# Patient Record
Sex: MALE | Race: WHITE | Employment: OTHER | URBAN - METROPOLITAN AREA
[De-identification: names, ages, dates, MRNs, and addresses within clinical notes are randomized per-mention and may not be internally consistent; named-entity substitution may affect disease eponyms.]

---

## 2020-09-30 RX ORDER — ROSUVASTATIN CALCIUM 20 MG/1
TABLET, COATED ORAL
Qty: 90 TAB | Refills: 1 | Status: SHIPPED | OUTPATIENT
Start: 2020-09-30 | End: 2020-10-06 | Stop reason: SDUPTHER

## 2020-10-06 ENCOUNTER — TELEPHONE (OUTPATIENT)
Dept: INTERNAL MEDICINE CLINIC | Age: 60
End: 2020-10-06

## 2020-10-06 DIAGNOSIS — E03.9 ACQUIRED HYPOTHYROIDISM: Primary | ICD-10-CM

## 2020-10-06 DIAGNOSIS — E78.2 MIXED HYPERLIPIDEMIA: ICD-10-CM

## 2020-10-06 RX ORDER — LEVOTHYROXINE SODIUM 88 UG/1
88 TABLET ORAL
Qty: 90 TAB | Refills: 1 | Status: SHIPPED | OUTPATIENT
Start: 2020-10-06 | End: 2021-06-15

## 2020-10-06 RX ORDER — ROSUVASTATIN CALCIUM 20 MG/1
20 TABLET, COATED ORAL
Qty: 90 TAB | Refills: 1 | Status: SHIPPED | OUTPATIENT
Start: 2020-10-06 | End: 2021-02-05 | Stop reason: SDUPTHER

## 2020-10-06 RX ORDER — ATORVASTATIN CALCIUM 40 MG/1
40 TABLET, FILM COATED ORAL
COMMUNITY
End: 2020-10-06 | Stop reason: SDUPTHER

## 2020-10-06 NOTE — TELEPHONE ENCOUNTER
Patient called and needs his levothyroxine 88 mcg tab and his rosuvastatin 20 mg tab sent in to [facility] in [place]

## 2020-11-29 ENCOUNTER — TELEPHONE (OUTPATIENT)
Dept: INTERNAL MEDICINE CLINIC | Age: 60
End: 2020-11-29

## 2020-11-29 DIAGNOSIS — K21.9 GASTROESOPHAGEAL REFLUX DISEASE, UNSPECIFIED WHETHER ESOPHAGITIS PRESENT: Primary | ICD-10-CM

## 2020-11-30 RX ORDER — OMEPRAZOLE 40 MG/1
40 CAPSULE, DELAYED RELEASE ORAL DAILY
Qty: 90 CAP | Refills: 2 | Status: SHIPPED | OUTPATIENT
Start: 2020-11-30 | End: 2021-08-23

## 2020-11-30 RX ORDER — METFORMIN HYDROCHLORIDE 500 MG/1
TABLET ORAL
Qty: 180 TAB | Refills: 2 | Status: SHIPPED | OUTPATIENT
Start: 2020-11-30 | End: 2021-08-28

## 2020-11-30 NOTE — TELEPHONE ENCOUNTER
Patient called and needs a refill on his omeprazole 40 mg cap delayed release sent in to [facility] pharmacy

## 2020-12-02 ENCOUNTER — TELEPHONE (OUTPATIENT)
Dept: INTERNAL MEDICINE CLINIC | Age: 60
End: 2020-12-02

## 2020-12-02 DIAGNOSIS — E11.9 CONTROLLED TYPE 2 DIABETES MELLITUS WITHOUT COMPLICATION, WITHOUT LONG-TERM CURRENT USE OF INSULIN (HCC): ICD-10-CM

## 2020-12-02 DIAGNOSIS — E78.2 MIXED HYPERLIPIDEMIA: ICD-10-CM

## 2020-12-02 DIAGNOSIS — E03.9 ACQUIRED HYPOTHYROIDISM: ICD-10-CM

## 2020-12-02 DIAGNOSIS — I10 ESSENTIAL HYPERTENSION: Primary | ICD-10-CM

## 2020-12-02 NOTE — TELEPHONE ENCOUNTER
Patient called and would like for you to put in the orders for his yearly blood work so he can get it done before his appt

## 2020-12-10 ENCOUNTER — TELEPHONE (OUTPATIENT)
Dept: INTERNAL MEDICINE CLINIC | Age: 60
End: 2020-12-10

## 2020-12-10 NOTE — TELEPHONE ENCOUNTER
Patient called and needs a refill on his metFORMIN but I see where it was sent on 11/30 for 90 days with 2 refills can you look into why they say they dont have his refill

## 2020-12-14 NOTE — TELEPHONE ENCOUNTER
Tried to call patient again and yes this was refilled with refills.  Patient has med at pharmacy, he needs to call the pharmacy

## 2020-12-23 RX ORDER — MECLIZINE HYDROCHLORIDE 25 MG/1
TABLET ORAL
Qty: 90 TAB | Refills: 1 | Status: SHIPPED | OUTPATIENT
Start: 2020-12-23 | End: 2021-11-09

## 2021-01-27 DIAGNOSIS — E03.9 ACQUIRED HYPOTHYROIDISM: ICD-10-CM

## 2021-01-27 DIAGNOSIS — E78.2 MIXED HYPERLIPIDEMIA: ICD-10-CM

## 2021-01-27 DIAGNOSIS — I10 ESSENTIAL HYPERTENSION: ICD-10-CM

## 2021-01-27 DIAGNOSIS — E11.9 CONTROLLED TYPE 2 DIABETES MELLITUS WITHOUT COMPLICATION, WITHOUT LONG-TERM CURRENT USE OF INSULIN (HCC): ICD-10-CM

## 2021-02-05 ENCOUNTER — OFFICE VISIT (OUTPATIENT)
Dept: INTERNAL MEDICINE CLINIC | Age: 61
End: 2021-02-05
Payer: COMMERCIAL

## 2021-02-05 VITALS
SYSTOLIC BLOOD PRESSURE: 142 MMHG | HEART RATE: 84 BPM | WEIGHT: 235 LBS | BODY MASS INDEX: 36.88 KG/M2 | HEIGHT: 67 IN | TEMPERATURE: 97.7 F | RESPIRATION RATE: 20 BRPM | DIASTOLIC BLOOD PRESSURE: 88 MMHG | OXYGEN SATURATION: 94 %

## 2021-02-05 DIAGNOSIS — E11.9 CONTROLLED TYPE 2 DIABETES MELLITUS WITHOUT COMPLICATION, WITHOUT LONG-TERM CURRENT USE OF INSULIN (HCC): Primary | ICD-10-CM

## 2021-02-05 DIAGNOSIS — E03.9 ACQUIRED HYPOTHYROIDISM: ICD-10-CM

## 2021-02-05 DIAGNOSIS — Z00.00 ANNUAL PHYSICAL EXAM: ICD-10-CM

## 2021-02-05 DIAGNOSIS — I10 ESSENTIAL HYPERTENSION: ICD-10-CM

## 2021-02-05 DIAGNOSIS — E66.01 MORBID OBESITY DUE TO EXCESS CALORIES (HCC): ICD-10-CM

## 2021-02-05 DIAGNOSIS — E78.2 MIXED HYPERLIPIDEMIA: ICD-10-CM

## 2021-02-05 PROBLEM — K42.9 UMBILICAL HERNIA WITHOUT OBSTRUCTION AND WITHOUT GANGRENE: Status: ACTIVE | Noted: 2021-02-05

## 2021-02-05 PROBLEM — K21.00 GASTROESOPHAGEAL REFLUX DISEASE WITH ESOPHAGITIS: Status: ACTIVE | Noted: 2021-02-05

## 2021-02-05 PROBLEM — N43.3 HYDROCELE IN ADULT: Status: ACTIVE | Noted: 2021-02-05

## 2021-02-05 PROCEDURE — 99396 PREV VISIT EST AGE 40-64: CPT | Performed by: INTERNAL MEDICINE

## 2021-02-05 PROCEDURE — 99214 OFFICE O/P EST MOD 30 MIN: CPT | Performed by: INTERNAL MEDICINE

## 2021-02-05 RX ORDER — ROSUVASTATIN CALCIUM 20 MG/1
20 TABLET, COATED ORAL
Qty: 90 TAB | Refills: 1 | Status: SHIPPED | OUTPATIENT
Start: 2021-02-05 | End: 2021-11-09

## 2021-02-05 RX ORDER — LISINOPRIL 5 MG/1
5 TABLET ORAL DAILY
Qty: 90 TAB | Refills: 1 | Status: SHIPPED | OUTPATIENT
Start: 2021-02-05 | End: 2021-07-17

## 2021-02-05 NOTE — PROGRESS NOTES
1. Mixed hyperlipidemia  ldl WAS LESS THAN 70. Good control  - rosuvastatin (CRESTOR) 20 mg tablet; Take 1 Tab by mouth nightly. Dispense: 90 Tab; Refill: 1    2. Controlled type 2 diabetes mellitus without complication, without long-term current use of insulin (HCC)  The patient's hemoglobin A1c was 6.4. I am very happy with this. He is due for urine microalbumin. Will refer to podiatry for diabetic foot exam.  He just had his eye exam last month which was normal.  I am also referring him to diabetic education  - REFERRAL TO PODIATRY  - MICROALBUMIN, UR, RAND W/ MICROALB/CREAT RATIO  - REFERRAL TO DIABETIC EDUCATION    3. Morbid obesity due to excess calories [facility])  This is a new problem especially given the amount of weight gain he has had since I last saw him. It appears he is gained at least 25 pounds. I am referring him to diabetic teaching but I have also given him resources including information to look up on YouTube regarding the keto diet. I have set the goal of 20 pounds of weight loss over the next 3 months    4. Essential hypertension  This is a new problem. We will start Zestril 5 mg daily which will serve to help his blood pressure and protect his kidneys from the effects of diabetes  - lisinopriL (PRINIVIL, ZESTRIL) 5 mg tablet; Take 1 Tab by mouth daily. Dispense: 90 Tab; Refill: 1    5. Acquired hypothyroidism  This is a chronic problem. Continue Synthroid but check a TSH  - TSH 3RD GENERATION    6. Annual physical exam  Good urine microalbumin and a PSA  - MICROALBUMIN, UR, RAND W/ MICROALB/CREAT RATIO  - PSA SCREENING (SCREENING)      Chief Complaint   Patient presents with    Physical    f/u htn, dm, obesity    HPI   This is a very pleasant 50-year-old gentleman with history of diabetes and dyslipidemia who presents today for his annual physical exam.  He reports he has been doing well although admittedly has gained 20 to 30 pounds.   He blames much of this on his sedentary lifestyle and the fact that he likes food too much. He is also been complaining of increasing aches and pains including hip pain and knee pain. He reports some mild paresthesias in his feet as well and as it turns out he has not seen a podiatrist for his diabetic foot exam.  He denies any headache or vision changes he has no chest pain palpitations or shortness of breath. He has been checking his blood sugars and they are always in the very low 100s. He has been tolerating his statin with out any new muscle pains. He overall reports he feels really good and is working really hard. He actually notes he works all the time. Patient Active Problem List   Diagnosis Code    Hyperlipidemia E78.5    Gastroesophageal reflux disease with esophagitis K21.00    Hydrocele in adult Q49.7    Umbilical hernia without obstruction and without gangrene K42.9        Current Outpatient Medications on File Prior to Visit   Medication Sig Dispense Refill    meclizine (ANTIVERT) 25 mg tablet take 1 tablet by mouth three times a day 90 Tab 1    metFORMIN (GLUCOPHAGE) 500 mg tablet take 1 tablet by mouth twice a day for 90 DAYS 180 Tab 2    omeprazole (PRILOSEC) 40 mg capsule Take 1 Cap by mouth daily. 90 Cap 2    levothyroxine (SYNTHROID) 88 mcg tablet Take 1 Tab by mouth Daily (before breakfast). 90 Tab 1     No current facility-administered medications on file prior to visit. ROS  - GEN: no weight gain/loss, no fevers or chills  - HEENT: no vision changes, no tinnitus, no sore throat  - CV: no cp, palpitations or edema  - RESP: no sob, cough  - ABD: no n/v/d, no blood in stool  - : no dysuria or changes in freq.   - SKIN: no rashes, ulcers  - Neuro: no resting tremors, parasthesia in extremities, no headaches  - MS: No weakness in extremities, no gait abnormalities  - Psych: negative for depression or anxiety      Visit Vitals  BP (!) 142/88   Pulse 84   Temp 97.7 °F (36.5 °C)   Resp 20   Ht 5' 7\" (1.702 m)   Wt 235 lb (106.6 kg)   SpO2 94%   BMI 36.81 kg/m²           Physical Exam  Constitutional:       Appearance: Normal appearance. obese. NAD and pleasant  HENT:      Head: Normocephalic. Nose: Nose normal.      Mouth/Throat:      Mouth: Mucous membranes are moist. Throat not inflammed  Eyes:      Extraocular Movements: Extraocular movements intact. Conjunctiva/sclera: Conjunctivae normal. Sclera anicteric     Pupils: Pupils are equal, round, and reactive to light. Cardiovascular:      Rate and Rhythm: Normal rate and regular rhythm. Pulses: Normal pulses. Pulmonary:      Effort: No respiratory distress. Breath sounds: CTAB and No stridor. No rhonchi. Abdominal:      General: There is no distension. NT, ND  Neurological:      Mental Status: patient is alert and oriented times 3.  No resting tremor, normal gait     Cranial Nerves: cranial nerves grossly intact  Muskuloskeletal     Full ROM in extremities     Normal gait  Skin     Dry without lesions on examined areas, warm to the touch       Deferred  Psychiatry     Calm, normal affect, interacting normally

## 2021-02-05 NOTE — PROGRESS NOTES
Georgie Billy presents today for   Chief Complaint   Patient presents with    Physical       Is someone accompanying this pt? no  Is the patient using any DME equipment during OV? no    Depression Screening:  3 most recent PHQ Screens 2/5/2021   Little interest or pleasure in doing things Not at all   Feeling down, depressed, irritable, or hopeless Not at all   Total Score PHQ 2 0       Learning Assessment:  Learning Assessment 2/5/2021   PRIMARY LEARNER Patient   HIGHEST LEVEL OF EDUCATION - PRIMARY LEARNER  GRADUATED HIGH SCHOOL OR GED   BARRIERS PRIMARY LEARNER NONE   PRIMARY LANGUAGE ENGLISH   LEARNER PREFERENCE PRIMARY DEMONSTRATION   ANSWERED BY patient   RELATIONSHIP SELF       Fall Risk  No flowsheet data found. ADL  No flowsheet data found. Health Maintenance reviewed and discussed and ordered per Provider. Health Maintenance Due   Topic Date Due    Hepatitis C Screening  1960    Foot Exam Q1  02/12/1969    MICROALBUMIN Q1  02/12/1969    Eye Exam Retinal or Dilated  02/12/1969    COVID-19 Vaccine (1 of 2) 02/12/1975    DTaP/Tdap/Td series (1 - Tdap) 02/12/1980    Shingrix Vaccine Age 50> (1 of 2) 02/12/2009    Colorectal Cancer Screening Combo  02/12/2009    Flu Vaccine (1) 09/01/2020   . Coordination of Care:  1. Have you been to the ER, urgent care clinic since your last visit? Hospitalized since your last visit? no    2. Have you seen or consulted any other health care providers outside of the [address] [place] [address] since your last visit? Include any pap smears or colon screening.  no

## 2021-03-30 LAB — HBA1C MFR BLD HPLC: 5.5 %

## 2021-06-15 ENCOUNTER — CLINICAL SUPPORT (OUTPATIENT)
Dept: INTERNAL MEDICINE CLINIC | Age: 61
End: 2021-06-15
Payer: COMMERCIAL

## 2021-06-15 DIAGNOSIS — E03.9 ACQUIRED HYPOTHYROIDISM: Primary | ICD-10-CM

## 2021-06-15 PROCEDURE — 36415 COLL VENOUS BLD VENIPUNCTURE: CPT | Performed by: INTERNAL MEDICINE

## 2021-06-15 NOTE — PROGRESS NOTES
Verbal order from provider Dr. Kathryn Quinonez to draw labs. Labs were drawn and sent to [facility] by Shashank Cruz LPN  Draw site Right [facility]. Patient tolerated draw with no distress.

## 2021-06-16 LAB
PSA SERPL-MCNC: 0.8 NG/ML (ref 0–4)
SPECIMEN STATUS REPORT, ROLRST: NORMAL
TSH SERPL DL<=0.005 MIU/L-ACNC: 4.57 UIU/ML (ref 0.45–4.5)

## 2021-06-22 ENCOUNTER — OFFICE VISIT (OUTPATIENT)
Dept: INTERNAL MEDICINE CLINIC | Age: 61
End: 2021-06-22
Payer: COMMERCIAL

## 2021-06-22 VITALS
DIASTOLIC BLOOD PRESSURE: 85 MMHG | SYSTOLIC BLOOD PRESSURE: 128 MMHG | OXYGEN SATURATION: 96 % | HEART RATE: 82 BPM | WEIGHT: 237 LBS | BODY MASS INDEX: 37.2 KG/M2 | HEIGHT: 67 IN | TEMPERATURE: 96.5 F | RESPIRATION RATE: 18 BRPM
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
## 2021-06-22 DIAGNOSIS — I10 ESSENTIAL HYPERTENSION: ICD-10-CM

## 2021-06-22 DIAGNOSIS — E03.9 ACQUIRED HYPOTHYROIDISM: ICD-10-CM

## 2021-06-22 DIAGNOSIS — R73.03 PREDIABETES: ICD-10-CM

## 2021-06-22 DIAGNOSIS — R25.2 SPASM: ICD-10-CM

## 2021-06-22 DIAGNOSIS — E78.2 MODERATE MIXED HYPERLIPIDEMIA NOT REQUIRING STATIN THERAPY: Primary | ICD-10-CM

## 2021-06-22 PROCEDURE — 99214 OFFICE O/P EST MOD 30 MIN: CPT | Performed by: INTERNAL MEDICINE

## 2021-06-22 RX ORDER — HYDROXYZINE PAMOATE 25 MG/1
25 CAPSULE ORAL
Qty: 30 CAPSULE | Refills: 0 | Status: SHIPPED | OUTPATIENT
Start: 2021-06-22 | End: 2021-07-06

## 2021-06-22 RX ORDER — LANOLIN ALCOHOL/MO/W.PET/CERES
400 CREAM (GRAM) TOPICAL DAILY
Qty: 60 TABLET | Refills: 0 | Status: SHIPPED | OUTPATIENT
Start: 2021-06-22

## 2021-06-22 NOTE — PROGRESS NOTES
1. Moderate mixed hyperlipidemia not requiring statin therapy  Continuing to hold his statin. I think this is appropriate until we get his labs. I strongly suspect his muscle spasms are due to low magnesium from his keto diet. We will check a lipid profile  - LIPID PANEL    2. Prediabetes  We will repeat a hemoglobin A1c. Last read was 5.5. We may need to go down on his Metformin given his weight loss      3. Essential hypertension  Blood pressure is well controlled. Check a CMP  - METABOLIC PANEL, COMPREHENSIVE    4. Acquired hypothyroidism  Check a TSH continue Synthroid  - TSH 3RD GENERATION    5. Spasm  This is an acute problem which is commonly seen in the ketogenic diet. The muscle spasms are far more pronounced in his right thigh. We will check a magnesium level and start magnesium oxide  - MAGNESIUM  - magnesium oxide (MAG-OX) 400 mg tablet; Take 1 Tablet by mouth daily. Dispense: 60 Tablet; Refill: 0    6. Bug bite, initial encounter  He has several bug bites on his legs. We will start Vistaril for the itching. I have also recommended a Benadryl stick  - hydrOXYzine pamoate (VISTARIL) 25 mg capsule; Take 1 Capsule by mouth three (3) times daily as needed for Itching for up to 14 days. Dispense: 30 Capsule; Refill: 0       Chief Complaint   Patient presents with    Follow-up    Claudication     leg cramps        HPI   This is a delightful 27-year-old gentleman with a history of prediabetes dyslipidemia and hypothyroidism. Over the past 6 months he has lost at least 30 pounds. He has been on the keto diet and has been restricting his carbs to 30 g/day. He started suffering cramps though several weeks ago. They are primarily in his right thigh and they can go all the way down to his calf. He has not been taking a supplement but there is a magnesium powder that he has taken a couple of times which he reports improved his symptoms.   He has no headache or vision changes he has no chest pain palpitation shortness of breath or cough and otherwise he reports has been doing great. He did have several bug bites which are causing him to itch quite a bit. He is asking for some help in alleviating this      Chief Complaint   Patient presents with    Follow-up    Claudication     leg cramps        HPI   Patient Active Problem List   Diagnosis Code    Hyperlipidemia E78.5    Gastroesophageal reflux disease with esophagitis K21.00    Hydrocele in adult J98.0    Umbilical hernia without obstruction and without gangrene K42.9        Current Outpatient Medications on File Prior to Visit   Medication Sig Dispense Refill    levothyroxine (SYNTHROID) 88 mcg tablet take 1 tablet by mouth once daily BEFORE BREAKFAST 90 Tablet 0    rosuvastatin (CRESTOR) 20 mg tablet Take 1 Tab by mouth nightly. 90 Tab 1    lisinopriL (PRINIVIL, ZESTRIL) 5 mg tablet Take 1 Tab by mouth daily. 90 Tab 1    meclizine (ANTIVERT) 25 mg tablet take 1 tablet by mouth three times a day 90 Tab 1    metFORMIN (GLUCOPHAGE) 500 mg tablet take 1 tablet by mouth twice a day for 90 DAYS 180 Tab 2    omeprazole (PRILOSEC) 40 mg capsule Take 1 Cap by mouth daily. 90 Cap 2     No current facility-administered medications on file prior to visit. ROS  - GEN: + weight loss, no fevers or chills  - HEENT: no vision changes, no tinnitus, no sore throat  - CV: no cp, palpitations or edema  - RESP: no sob, cough  - ABD: no n/v/d, no blood in stool  - : no dysuria or changes in freq. - SKIN:+ rashes,- ulcers  - Neuro: no resting tremors, parasthesia in extremities, no headaches  - MS: No weakness in extremities, no gait abnormalities + muslce spasms  - Psych: negative for depression or anxiety      Visit Vitals  /85   Pulse 82   Temp (!) 96.5 °F (35.8 °C)   Resp 18   Ht 5' 7\" (1.702 m)   Wt 237 lb (107.5 kg)   SpO2 96%   BMI 37.12 kg/m²           Physical Exam  Constitutional:       Appearance: Normal appearance. overweight.  NAD and pleasant  HENT:      Head: Normocephalic. Nose: Nose normal.      Mouth/Throat:     Eyes:      Extraocular Movements: Extraocular movements intact. Conjunctiva/sclera: Conjunctivae normal. Sclera anicteric     Pupils: Pupils are equal, round, and reactive to light. Cardiovascular:      Rate and Rhythm: Normal rate and regular rhythm. Pulses: Normal pulses. Pulmonary:      Effort: No respiratory distress. Breath sounds: CTAB and No stridor. No rhonchi. Abdominal:      General: There is no distension. NT, ND  Neurological:      Mental Status: patient is alert and oriented times 3. No resting tremor, normal gait     Cranial Nerves: cranial nerves grossly intact  Muskuloskeletal     Full ROM in extremities     Normal gait    Right hamstring is very tight  Skin     Multiple bug bites on shins.  No warmth associated with the lesions and no streaking       Deferred  Psychiatry     Calm, normal affect, interacting normally

## 2021-06-22 NOTE — PROGRESS NOTES
Follow up, cholesterol med was stopped,however,leg cramps still remain. Has been drinking \"calm\"      Sonal Patterson presents today for   Chief Complaint   Patient presents with    Follow-up    Claudication     leg cramps       Is someone accompanying this pt? no  Is the patient using any DME equipment during OV? no    Depression Screening:  3 most recent PHQ Screens 6/22/2021   Little interest or pleasure in doing things Not at all   Feeling down, depressed, irritable, or hopeless Not at all   Total Score PHQ 2 0       Learning Assessment:  Learning Assessment 2/5/2021   PRIMARY LEARNER Patient   HIGHEST LEVEL OF EDUCATION - PRIMARY LEARNER  GRADUATED HIGH SCHOOL OR GED   BARRIERS PRIMARY LEARNER NONE   PRIMARY LANGUAGE ENGLISH   LEARNER PREFERENCE PRIMARY DEMONSTRATION   ANSWERED BY patient   RELATIONSHIP SELF       Fall Risk  No flowsheet data found. ADL  No flowsheet data found. Health Maintenance reviewed and discussed and ordered per Provider. Health Maintenance Due   Topic Date Due    Hepatitis C Screening  Never done    Pneumococcal 0-64 years (1 of 2 - PPSV23) Never done    DTaP/Tdap/Td series (1 - Tdap) 02/12/1980    Shingrix Vaccine Age 50> (1 of 2) Never done    Colorectal Cancer Screening Combo  Never done    COVID-19 Vaccine (2 - Moderna 2-dose series) 02/11/2021   . Coordination of Care:  1. Have you been to the ER, urgent care clinic since your last visit? Hospitalized since your last visit? no    2. Have you seen or consulted any other health care providers outside of the [address] [place] [address] since your last visit? Include any pap smears or colon screening.  no

## 2021-06-24 ENCOUNTER — HOSPITAL ENCOUNTER (OUTPATIENT)
Dept: LAB | Age: 61
Discharge: HOME | End: 2021-06-24

## 2021-06-25 LAB
ALBUMIN SERPL-MCNC: 4.6 G/DL (ref 3.8–4.8)
ALBUMIN/GLOB SERPL: 2.9 {RATIO} (ref 1.2–2.2)
ALP SERPL-CCNC: 82 IU/L (ref 48–121)
ALT SERPL-CCNC: 15 IU/L (ref 0–44)
AST SERPL-CCNC: 18 IU/L (ref 0–40)
BILIRUB SERPL-MCNC: 0.5 MG/DL (ref 0–1.2)
BUN SERPL-MCNC: 22 MG/DL (ref 8–27)
BUN/CREAT SERPL: 21 (ref 10–24)
CALCIUM SERPL-MCNC: 9.6 MG/DL (ref 8.6–10.2)
CHLORIDE SERPL-SCNC: 107 MMOL/L (ref 96–106)
CHOLEST SERPL-MCNC: 215 MG/DL (ref 100–199)
CO2 SERPL-SCNC: 22 MMOL/L (ref 20–29)
CREAT SERPL-MCNC: 1.06 MG/DL (ref 0.76–1.27)
EST. AVERAGE GLUCOSE BLD GHB EST-MCNC: 123 MG/DL
GLOBULIN SER CALC-MCNC: 1.6 G/DL (ref 1.5–4.5)
GLUCOSE SERPL-MCNC: 98 MG/DL (ref 65–99)
HBA1C MFR BLD: 5.9 % (ref 4.8–5.6)
HDLC SERPL-MCNC: 47 MG/DL
LDLC SERPL CALC-MCNC: 141 MG/DL (ref 0–99)
MAGNESIUM SERPL-MCNC: 2.1 MG/DL (ref 1.6–2.3)
POTASSIUM SERPL-SCNC: 5 MMOL/L (ref 3.5–5.2)
PROT SERPL-MCNC: 6.2 G/DL (ref 6–8.5)
SODIUM SERPL-SCNC: 141 MMOL/L (ref 134–144)
TRIGL SERPL-MCNC: 149 MG/DL (ref 0–149)
TSH SERPL DL<=0.005 MIU/L-ACNC: 5.58 UIU/ML (ref 0.45–4.5)
VLDLC SERPL CALC-MCNC: 27 MG/DL (ref 5–40)

## 2021-07-01 ENCOUNTER — TELEPHONE (OUTPATIENT)
Dept: INTERNAL MEDICINE CLINIC | Age: 61
End: 2021-07-01

## 2021-07-01 LAB — HBA1C MFR BLD HPLC: 5.6 %

## 2021-07-17 DIAGNOSIS — I10 ESSENTIAL HYPERTENSION: ICD-10-CM

## 2021-07-17 RX ORDER — LISINOPRIL 5 MG/1
TABLET ORAL
Qty: 90 TABLET | Refills: 1 | Status: SHIPPED | OUTPATIENT
Start: 2021-07-17 | End: 2021-09-29 | Stop reason: SDUPTHER

## 2021-07-19 ENCOUNTER — TELEPHONE (OUTPATIENT)
Dept: INTERNAL MEDICINE CLINIC | Age: 61
End: 2021-07-19

## 2021-07-19 DIAGNOSIS — E78.2 MIXED HYPERLIPIDEMIA: ICD-10-CM

## 2021-07-19 NOTE — TELEPHONE ENCOUNTER
Patient stated he is moving in the next month and would like to keep Dr. Riri Garces as his Doctor, but is requesting a 90 day supply to [facility] in [place] for his scripts because his insurance is also changing at the end of the month RX Metformin, Levothyroxine, Crestor patient stated the RX Crestor 20 mg, made patiens legs cramp he has not been taking it but his blood work showed chol elevated so he suggested a lower dose of the Crestor.    [phone]

## 2021-08-23 DIAGNOSIS — K21.9 GASTROESOPHAGEAL REFLUX DISEASE, UNSPECIFIED WHETHER ESOPHAGITIS PRESENT: ICD-10-CM

## 2021-08-23 RX ORDER — OMEPRAZOLE 40 MG/1
CAPSULE, DELAYED RELEASE ORAL
Qty: 90 CAPSULE | Refills: 2 | Status: SHIPPED | OUTPATIENT
Start: 2021-08-23 | End: 2021-11-09

## 2021-08-28 RX ORDER — METFORMIN HYDROCHLORIDE 500 MG/1
TABLET ORAL
Qty: 180 TABLET | Refills: 2 | Status: SHIPPED | OUTPATIENT
Start: 2021-08-28 | End: 2021-11-09 | Stop reason: SDUPTHER

## 2021-09-14 DIAGNOSIS — E03.9 ACQUIRED HYPOTHYROIDISM: ICD-10-CM

## 2021-09-14 DIAGNOSIS — I10 ESSENTIAL HYPERTENSION: ICD-10-CM

## 2021-09-14 RX ORDER — LEVOTHYROXINE SODIUM 88 UG/1
TABLET ORAL
Qty: 90 TABLET | Refills: 1 | Status: SHIPPED | OUTPATIENT
Start: 2021-09-14 | End: 2021-09-29 | Stop reason: SDUPTHER

## 2021-09-28 NOTE — TELEPHONE ENCOUNTER
Patient has moved to [facility], now uses [facility] in [facility]. Patient is travailing to [place] and requesting refill on Lisinopril and Levothyroxine.

## 2021-09-29 RX ORDER — LISINOPRIL 5 MG/1
5 TABLET ORAL DAILY
Qty: 90 TABLET | Refills: 1 | Status: SHIPPED | OUTPATIENT
Start: 2021-09-29 | End: 2021-11-09 | Stop reason: SDUPTHER

## 2021-09-29 RX ORDER — LEVOTHYROXINE SODIUM 88 UG/1
88 TABLET ORAL
Qty: 90 TABLET | Refills: 1 | Status: SHIPPED | OUTPATIENT
Start: 2021-09-29 | End: 2021-11-09 | Stop reason: SDUPTHER

## 2021-11-09 ENCOUNTER — HOSPITAL ENCOUNTER (OUTPATIENT)
Dept: LAB | Age: 61
Discharge: HOME | End: 2021-11-09
Payer: COMMERCIAL

## 2021-11-09 ENCOUNTER — OFFICE VISIT (OUTPATIENT)
Dept: INTERNAL MEDICINE CLINIC | Age: 61
End: 2021-11-09
Payer: COMMERCIAL

## 2021-11-09 VITALS
HEIGHT: 67 IN | DIASTOLIC BLOOD PRESSURE: 83 MMHG | SYSTOLIC BLOOD PRESSURE: 126 MMHG | RESPIRATION RATE: 20 BRPM | OXYGEN SATURATION: 92 % | WEIGHT: 199.4 LBS | TEMPERATURE: 97.2 F | BODY MASS INDEX: 31.3 KG/M2 | HEART RATE: 97 BPM

## 2021-11-09 DIAGNOSIS — I10 ESSENTIAL HYPERTENSION: ICD-10-CM

## 2021-11-09 DIAGNOSIS — R73.03 PREDIABETES: ICD-10-CM

## 2021-11-09 DIAGNOSIS — E78.2 MIXED HYPERLIPIDEMIA: Primary | ICD-10-CM

## 2021-11-09 DIAGNOSIS — E03.9 ACQUIRED HYPOTHYROIDISM: ICD-10-CM

## 2021-11-09 DIAGNOSIS — G57.02 PIRIFORMIS SYNDROME OF LEFT SIDE: ICD-10-CM

## 2021-11-09 DIAGNOSIS — Z23 ENCOUNTER FOR IMMUNIZATION: ICD-10-CM

## 2021-11-09 DIAGNOSIS — Z12.11 SCREEN FOR COLON CANCER: ICD-10-CM

## 2021-11-09 LAB
CHOLEST SERPL-MCNC: 192 MG/DL
HDLC SERPL-MCNC: 53 MG/DL (ref 40–60)
HDLC SERPL: 3.6 {RATIO} (ref 0–5)
LDLC SERPL CALC-MCNC: 110 MG/DL (ref 0–100)
LIPID PROFILE,FLP: ABNORMAL
TRIGL SERPL-MCNC: 145 MG/DL (ref ?–150)
VLDLC SERPL CALC-MCNC: 29 MG/DL

## 2021-11-09 PROCEDURE — 99214 OFFICE O/P EST MOD 30 MIN: CPT | Performed by: INTERNAL MEDICINE

## 2021-11-09 PROCEDURE — 80061 LIPID PANEL: CPT

## 2021-11-09 PROCEDURE — 90471 IMMUNIZATION ADMIN: CPT | Performed by: INTERNAL MEDICINE

## 2021-11-09 PROCEDURE — 90686 IIV4 VACC NO PRSV 0.5 ML IM: CPT | Performed by: INTERNAL MEDICINE

## 2021-11-09 PROCEDURE — 36415 COLL VENOUS BLD VENIPUNCTURE: CPT

## 2021-11-09 RX ORDER — LEVOTHYROXINE SODIUM 88 UG/1
88 TABLET ORAL
Qty: 90 TABLET | Refills: 1 | Status: SHIPPED | OUTPATIENT
Start: 2021-11-09

## 2021-11-09 RX ORDER — LISINOPRIL 5 MG/1
5 TABLET ORAL DAILY
Qty: 90 TABLET | Refills: 1 | Status: SHIPPED | OUTPATIENT
Start: 2021-11-09

## 2021-11-09 RX ORDER — METFORMIN HYDROCHLORIDE 500 MG/1
500 TABLET ORAL 2 TIMES DAILY
Qty: 180 TABLET | Refills: 2 | Status: SHIPPED | OUTPATIENT
Start: 2021-11-09

## 2021-11-09 RX ORDER — GLUCOSAM/CHONDRO/HERB 149/HYAL 750-100 MG
1 TABLET ORAL DAILY
COMMUNITY

## 2021-11-09 NOTE — PROGRESS NOTES
Reina Betancourt is a 57 y.o. male who presents for routine immunizations. He denies any symptoms , reactions or allergies that would exclude them from being immunized today. Risks and adverse reactions were discussed and the VIS was given to them. All questions were addressed. He was observed for 15 min post injection. There were no reactions observed.     Nhi Slaughter LPN

## 2021-11-09 NOTE — PROGRESS NOTES
Complaining of pain in left buttocks. Rosuvastatin causing cramps. Harden Arsenio presents today for   Chief Complaint   Patient presents with    Follow-up     6 month follow up       Is someone accompanying this pt? no  Is the patient using any DME equipment during [address]? no    Depression Screening:  3 most recent PHQ Screens 11/9/2021   Little interest or pleasure in doing things Not at all   Feeling down, depressed, irritable, or hopeless Not at all   Total Score PHQ 2 0       Learning Assessment:  Learning Assessment 2/5/2021   PRIMARY LEARNER Patient   HIGHEST LEVEL OF EDUCATION - PRIMARY LEARNER  GRADUATED HIGH SCHOOL OR GED   BARRIERS PRIMARY LEARNER NONE   PRIMARY LANGUAGE ENGLISH   LEARNER PREFERENCE PRIMARY DEMONSTRATION   ANSWERED BY patient   RELATIONSHIP SELF       Fall Risk  No flowsheet data found. ADL  ADL Assessment 11/9/2021   Feeding yourself No Help Needed   Getting from bed to chair No Help Needed   Getting dressed No Help Needed   Bathing or showering No Help Needed   Walk across the room (includes cane/walker) No Help Needed   Using the telphone No Help Needed   Taking your medications No Help Needed   Preparing meals No Help Needed   Managing money (expenses/bills) No Help Needed   Moderately strenuous housework (laundry) No Help Needed   Shopping for personal items (toiletries/medicines) No Help Needed   Shopping for groceries No Help Needed   Driving No Help Needed   Climbing a flight of stairs No Help Needed   Getting to places beyond walking distances No Help Needed       Health Maintenance reviewed and discussed and ordered per Provider. Health Maintenance Due   Topic Date Due    Hepatitis C Screening  Never done    Shingrix Vaccine Age 50> (1 of 2) Never done    DTaP/Tdap/Td series (1 - Tdap) 02/04/2010    COVID-19 Vaccine (2 - Moderna 2-dose series) 02/11/2021    Flu Vaccine (1) 09/01/2021   . Coordination of Care:  1.  \"Have you been to the ER, urgent care clinic since your last visit? Hospitalized since your last visit? \" No    2. \"Have you seen or consulted any other health care providers outside of the [address] [place] [address] since your last visit? \" No     3. For patients aged 39-70: Has the patient had a colonoscopy?  Due

## 2021-11-09 NOTE — PROGRESS NOTES
1. Mixed hyperlipidemia  The patient has gone off of his statin. I explained the need to continue this though I will check a lipid profile. He has been working on his weight  - LIPID PANEL    2. Essential hypertension  Pressure is controlled today. I am going to call him in lisinopril  - lisinopriL (PRINIVIL, ZESTRIL) 5 mg tablet; Take 1 Tablet by mouth daily. Dispense: 90 Tablet; Refill: 1    3. Prediabetes  Last hemoglobin A1c was 5.6. The patient really wants to go off of the Metformin. From my perspective if we can get his hemoglobin A1c less than 5.5 we may consider this. He is aggressively exercising and losing weight. He is also carb counting religiously. - metFORMIN (GLUCOPHAGE) 500 mg tablet; Take 1 Tablet by mouth two (2) times a day. Dispense: 180 Tablet; Refill: 2    4. Screen for colon cancer  For colonoscopy  - REFERRAL TO GASTROENTEROLOGY    5. Acquired hypothyroidism  TSH was acceptable. Continue Synthroid  - levothyroxine (SYNTHROID) 88 mcg tablet; Take 1 Tablet by mouth Daily (before breakfast). Dispense: 90 Tablet; Refill: 1    6. Encounter for immunization  We will administer flu vaccine  - INFLUENZA VIRUS VAC QUAD,SPLIT,PRESV FREE SYRINGE IM    7. Piriformis syndrome of left side  An acute problem. While in my office I applied 25 seconds of pressure to his left piriformis. Symptoms improved dramatically afterwards. I have asked him to continue to exercise and to watch it when he is lifting weights and to try to avoid squeezing his buttocks    Results for Rosalia Torrez (MRN [id number]) as of 11/9/2021 08:28   Ref.  Range 3/30/2021 00:00 6/15/2021 00:00 6/24/2021 00:00 7/1/2021 00:00   Sodium Latest Ref Range: 134 - 144 mmol/L   141    Potassium Latest Ref Range: 3.5 - 5.2 mmol/L   5.0    Chloride Latest Ref Range: 96 - 106 mmol/L   107 (H)    CO2 Latest Ref Range: 20 - 29 mmol/L   22    Glucose Latest Ref Range: 65 - 99 mg/dL   98    BUN Latest Ref Range: 8 - 27 mg/dL   22    Creatinine Latest Ref Range: 0.76 - 1.27 mg/dL   1.06    BUN/Creatinine ratio Latest Ref Range: 10 - 24    21    Calcium Latest Ref Range: 8.6 - 10.2 mg/dL   9.6    Magnesium Latest Ref Range: 1.6 - 2.3 mg/dL   2.1    GFR est non-AA Latest Ref Range: >59 mL/min/1.73   75    GFR est AA Latest Ref Range: >59 mL/min/1.73   87    Bilirubin, total Latest Ref Range: 0.0 - 1.2 mg/dL   0.5    Protein, total Latest Ref Range: 6.0 - 8.5 g/dL   6.2    Albumin Latest Ref Range: 3.8 - 4.8 g/dL   4.6    A-G Ratio Latest Ref Range: 1.2 - 2.2    2.9 (H)    ALT Latest Ref Range: 0 - 44 IU/L   15    AST Latest Ref Range: 0 - 40 IU/L   18    Alk. phosphatase Latest Ref Range: 48 - 121 IU/L   82    Triglyceride Latest Ref Range: 0 - 149 mg/dL   149    Cholesterol, total Latest Ref Range: 100 - 199 mg/dL   215 (H)    HDL Cholesterol Latest Ref Range: >39 mg/dL   47    VLDL, calculated Latest Ref Range: 5 - 40 mg/dL   27    LDL, calculated Latest Ref Range: 0 - 99 mg/dL   141 (H)    Hemoglobin A1c, (calculated) Latest Ref Range: 4.8 - 5.6 %   5.9 (H)    Estimated average glucose Latest Units: mg/dL   123    TSH Latest Ref Range: 0.450 - 4.500 uIU/mL  4.570 (H) 5.580 (H)    Prostate Specific Ag Latest Ref Range: 0.0 - 4.0 ng/mL  0.8     Hemoglobin A1c, External Latest Units: % 5.5   5.6     Chief Complaint   Patient presents with    Follow-up     6 month follow up        HPI   This is a delightful 68-year-old gentleman with a history of prediabetes GERD dyslipidemia and hypothyroidism who presents for follow-up. He reports overall he has been doing well and is continuing to lose weight. He has been carb counting and using a ketogenic diet. He stopped taking his Crestor because he was worried of its side effects. He has no headache vision changes chest pain palpitation shortness of breath or nausea or vomiting. He is complaining of pain in his left buttock. He first noticed it a few weeks ago after working out.  The pain is worse in the morning it feels like an ice pick going into his left but cheek. He denies any radiation down his leg. \"It literally feels like one spot where someone hitting me with a knife\"  Patient Active Problem List   Diagnosis Code    Hyperlipidemia E78.5    Gastroesophageal reflux disease with esophagitis K21.00    Hydrocele in adult L47.2    Umbilical hernia without obstruction and without gangrene K42.9        Current Outpatient Medications on File Prior to Visit   Medication Sig Dispense Refill    omega 3-DHA-EPA-fish oil (Fish OiL) 1,000 mg (120 mg-180 mg) capsule Take 1 Capsule by mouth daily.  magnesium oxide (MAG-OX) 400 mg tablet Take 1 Tablet by mouth daily. 60 Tablet 0     No current facility-administered medications on file prior to visit. ROS  - GEN: + weight loss, no fevers or chills  - HEENT: no vision changes, no tinnitus, no sore throat  - CV: no cp, palpitations or edema  - RESP: no sob, cough  - ABD: no n/v/d, no blood in stool  - : no dysuria or changes in freq. - SKIN: no rashes, ulcers  -    Visit Vitals  /83   Pulse 97   Temp 97.2 °F (36.2 °C)   Resp 20   Ht 5' 7\" (1.702 m)   Wt 199 lb 6.4 oz (90.4 kg)   SpO2 92%   BMI 31.23 kg/m²           Physical Exam  Constitutional:       Appearance: Normal appearance. overweight. NAD and pleasant  HENT:      Head: Normocephalic. Nose: Nose normal.      Mouth/Throat:      Mouth: Mucous membranes are moist. Throat not inflammed  Eyes:      Extraocular Movements: Extraocular movements intact. Conjunctiva/sclera: Conjunctivae normal. Sclera anicteric    Cardiovascular:      Rate and Rhythm: Normal rate and regular rhythm. Pulses: Normal pulses. Pulmonary:      Effort: No respiratory distress. Breath sounds: CTAB and No stridor. No rhonchi. Abdominal:      General: There is no distension. NT, ND    Muskuloskeletal  ++ point tenderness over left piriformis.  Muscle spasms noted during exam